# Patient Record
Sex: MALE | Race: WHITE | NOT HISPANIC OR LATINO
[De-identification: names, ages, dates, MRNs, and addresses within clinical notes are randomized per-mention and may not be internally consistent; named-entity substitution may affect disease eponyms.]

---

## 2020-05-19 ENCOUNTER — APPOINTMENT (OUTPATIENT)
Dept: PULMONOLOGY | Facility: CLINIC | Age: 25
End: 2020-05-19
Payer: COMMERCIAL

## 2020-05-19 DIAGNOSIS — Z56.0 UNEMPLOYMENT, UNSPECIFIED: ICD-10-CM

## 2020-05-19 DIAGNOSIS — Z87.09 PERSONAL HISTORY OF OTHER DISEASES OF THE RESPIRATORY SYSTEM: ICD-10-CM

## 2020-05-19 PROBLEM — Z00.00 ENCOUNTER FOR PREVENTIVE HEALTH EXAMINATION: Status: ACTIVE | Noted: 2020-05-19

## 2020-05-19 PROCEDURE — 99204 OFFICE O/P NEW MOD 45 MIN: CPT | Mod: 95

## 2020-05-19 SDOH — ECONOMIC STABILITY - INCOME SECURITY: UNEMPLOYMENT, UNSPECIFIED: Z56.0

## 2020-05-19 NOTE — REVIEW OF SYSTEMS
[Eye Irritation] : eye irritation [Sinus Problems] : sinus problems [Myalgias] : myalgias [Negative] : Endocrine

## 2020-05-19 NOTE — HISTORY OF PRESENT ILLNESS
[TextBox_4] : 05/19/2020: Consultation by video at pt's request for Covid follow up. Covid symptoms beginning 3/17 w anosmia, myalgias, fever, chills. Never tested or treated. Began to recover 10 days later but remained fatigued throughout April with some aches and pains. Had a sensation of chest pressure which has lifted. Overall 90% better, able to go on 1-2 hour excursions involving hiking, biking. Has a few concerns. 4x has had a "chest spasm" in the setting of exercise such as stretching, not vigorous exercise, lasting about 3 seconds. Two, feels a sense of sinus congestion and eye irritation especially after wearing mask. Three feels something - a lymph node? - in R posterior neck that seems to "flare" after wearing his mask. Reports sats 95% and resting HR 50s at home.\par

## 2020-05-19 NOTE — ASSESSMENT
[FreeTextEntry1] : Impression:\par Clinical dx Covid, improving\par ? lymphadenopathy\par \par Plan:\par Most of his symptoms are nonspecific and mild, and not worrisome. Can try a nasal saline spray for moisture w mask wearing if desired.\par Not possible to assess over video if he has an enlarged lymph node. This should be monitored and if it persists (2 weeks or whenever he is comfortable leaving home to see a physician) this should be evaluated.

## 2020-08-28 ENCOUNTER — APPOINTMENT (OUTPATIENT)
Dept: PULMONOLOGY | Facility: CLINIC | Age: 25
End: 2020-08-28

## 2020-09-16 ENCOUNTER — OUTPATIENT (OUTPATIENT)
Dept: OUTPATIENT SERVICES | Facility: HOSPITAL | Age: 25
LOS: 1 days | End: 2020-09-16
Payer: COMMERCIAL

## 2020-09-16 ENCOUNTER — APPOINTMENT (OUTPATIENT)
Dept: PULMONOLOGY | Facility: CLINIC | Age: 25
End: 2020-09-16
Payer: COMMERCIAL

## 2020-09-16 VITALS
HEIGHT: 65 IN | OXYGEN SATURATION: 98 % | DIASTOLIC BLOOD PRESSURE: 68 MMHG | HEART RATE: 88 BPM | BODY MASS INDEX: 24.32 KG/M2 | WEIGHT: 146 LBS | SYSTOLIC BLOOD PRESSURE: 100 MMHG | TEMPERATURE: 97.5 F

## 2020-09-16 DIAGNOSIS — R60.0 LOCALIZED EDEMA: ICD-10-CM

## 2020-09-16 DIAGNOSIS — Z11.59 ENCOUNTER FOR SCREENING FOR OTHER VIRAL DISEASES: ICD-10-CM

## 2020-09-16 DIAGNOSIS — U07.1 COVID-19: ICD-10-CM

## 2020-09-16 DIAGNOSIS — R06.02 SHORTNESS OF BREATH: ICD-10-CM

## 2020-09-16 PROCEDURE — 36415 COLL VENOUS BLD VENIPUNCTURE: CPT

## 2020-09-16 PROCEDURE — 99203 OFFICE O/P NEW LOW 30 MIN: CPT | Mod: 25

## 2020-09-16 PROCEDURE — 76604 US EXAM CHEST: CPT

## 2020-09-16 PROCEDURE — 93970 EXTREMITY STUDY: CPT

## 2020-09-16 PROCEDURE — 93970 EXTREMITY STUDY: CPT | Mod: 26

## 2020-09-16 PROCEDURE — 71046 X-RAY EXAM CHEST 2 VIEWS: CPT | Mod: 26

## 2020-09-16 PROCEDURE — 71046 X-RAY EXAM CHEST 2 VIEWS: CPT

## 2020-09-17 LAB — DEPRECATED D DIMER PPP IA-ACNC: <150 NG/ML DDU

## 2020-09-17 NOTE — PROCEDURE
[FreeTextEntry1] : 9/16/20  Bedside Lung Ultrasound\par No B lines seen\par No evidence of pleural effusion\par curtain sign appreciated

## 2020-09-17 NOTE — DISCUSSION/SUMMARY
[FreeTextEntry1] : Pertinent Physical Exam\par 100/68  HR 88  SpO2 98% on room air\par no pallor no icterus\par tiny lymph node R cervical chain\par No JVD at 45 degrees, No HJR\par No clinically detected HSM\par trace pitting edema RLE\par No dullness to percussion\par 2/6 systolic murmur aortic area radiating to LSB\par

## 2020-09-17 NOTE — HISTORY OF PRESENT ILLNESS
[TextBox_4] : Saw Dr. Wagner 05/19/2020: Consultation by video at pt's request for Covid follow up. Covid symptoms beginning 3/17 w anosmia, myalgias, fever, chills. Never tested or treated. Began to recover 10 days later but remained fatigued throughout April with some aches and pains. Had a sensation of chest pressure which has lifted. Overall 90% better, able to go on 1-2 hour excursions involving hiking, biking. Has a few concerns. 4x has had a "chest spasm" in the setting of exercise such as stretching, not vigorous exercise, lasting about 3 seconds. Two, feels a sense of sinus congestion and eye irritation especially after wearing mask. Three feels something - a lymph node? - in R posterior neck that seems to "flare" after wearing his mask. Reports sats 95% and resting HR 50s at home\par \par 9-16-20\par He had COVID in March The 17th was his 1st symptom. Took months of recuperation. Was only able to walk a block. Now able to ride his bike. Does not feel 100%. \par Went to cardiologist for chest pain. Dr. Whipple did an Echo and an EKG. Also had blood work.  Part of those labs was + COVID antibodies\par \par Current level of activity. Walks and bikes. Feels he is deconditioned\par Gets winded easily. When he bends over. 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

## 2020-09-17 NOTE — ASSESSMENT
[FreeTextEntry1] : It was a pleasure to meet Maverick in consultation today\par \par 1.Chest tightness and slight SOB\par -Most likely post-COVID phenomenon. Could have been involvement of lung with scarring and fibrosis that can persist. Can give rise to symptoms of chest tightness. \par -COVID seems to francheska about greater likelihood of blood clots. He has asymmetric swelling of LEs. We will get d-dimer and LE dopplers\par -Bedside US to look for lung abnormalities and pleural effusion did not reveal anything abnormal \par -We will obtain CXR and full PFTs\par \par RTC 1 month to review CXR and PFTs (can be Telehealth)\par

## 2020-09-17 NOTE — PHYSICAL EXAM
[No Acute Distress] : no acute distress [Normal Oropharynx] : normal oropharynx [No Neck Mass] : no neck mass [Normal Appearance] : normal appearance [No JVD] : no jvd [Murmur ___ / 6] : murmur [unfilled] / 6 [Normal Rate/Rhythm] : normal rate/rhythm [No Resp Distress] : no resp distress [Normal S1, S2] : normal s1, s2 [Clear to Auscultation Bilaterally] : clear to auscultation bilaterally [No Murmurs] : no murmurs [No Abnormalities] : no abnormalities [Benign] : benign [No Clubbing] : no clubbing [No Cyanosis] : no cyanosis [Normal Gait] : normal gait [FROM] : FROM [No Edema] : no edema [Normal Color/ Pigmentation] : normal color/ pigmentation [No Focal Deficits] : no focal deficits [Oriented x3] : oriented x3 [Normal Affect] : normal affect [TextBox_44] : tiny peas-sized lymph node palpated in Right posterior cervical chain [TextBox_54] : 2/6 systolic murmur aortic area radiating to LSB

## 2020-10-04 ENCOUNTER — LABORATORY RESULT (OUTPATIENT)
Age: 25
End: 2020-10-04

## 2020-10-06 ENCOUNTER — OUTPATIENT (OUTPATIENT)
Dept: OUTPATIENT SERVICES | Facility: HOSPITAL | Age: 25
LOS: 1 days | End: 2020-10-06
Payer: COMMERCIAL

## 2020-10-06 DIAGNOSIS — R06.02 SHORTNESS OF BREATH: ICD-10-CM

## 2020-10-06 PROCEDURE — 94729 DIFFUSING CAPACITY: CPT | Mod: 26

## 2020-10-06 PROCEDURE — 94618 PULMONARY STRESS TESTING: CPT | Mod: 26

## 2020-10-06 PROCEDURE — 94726 PLETHYSMOGRAPHY LUNG VOLUMES: CPT | Mod: 26

## 2020-10-06 PROCEDURE — 94760 N-INVAS EAR/PLS OXIMETRY 1: CPT

## 2020-10-06 PROCEDURE — 94618 PULMONARY STRESS TESTING: CPT

## 2020-10-06 PROCEDURE — 94010 BREATHING CAPACITY TEST: CPT | Mod: 26

## 2020-10-06 PROCEDURE — 94729 DIFFUSING CAPACITY: CPT

## 2020-10-06 PROCEDURE — 94726 PLETHYSMOGRAPHY LUNG VOLUMES: CPT

## 2020-10-06 PROCEDURE — 94060 EVALUATION OF WHEEZING: CPT

## 2020-10-07 LAB
SARS-COV-2 IGG SERPL IA-ACNC: 18.1 INDEX
SARS-COV-2 IGG SERPL QL IA: POSITIVE

## 2021-09-10 ENCOUNTER — APPOINTMENT (OUTPATIENT)
Dept: ORTHOPEDIC SURGERY | Facility: CLINIC | Age: 26
End: 2021-09-10
Payer: MEDICAID

## 2021-09-10 VITALS — WEIGHT: 150 LBS | RESPIRATION RATE: 16 BRPM | HEIGHT: 66 IN | BODY MASS INDEX: 24.11 KG/M2

## 2021-09-10 DIAGNOSIS — Z78.9 OTHER SPECIFIED HEALTH STATUS: ICD-10-CM

## 2021-09-10 PROCEDURE — 29085 APPL CAST HAND&LWR FOREARM: CPT | Mod: LT

## 2021-09-10 PROCEDURE — 99204 OFFICE O/P NEW MOD 45 MIN: CPT | Mod: 25

## 2021-09-10 PROCEDURE — 73130 X-RAY EXAM OF HAND: CPT | Mod: LT

## 2021-09-24 ENCOUNTER — APPOINTMENT (OUTPATIENT)
Dept: ORTHOPEDIC SURGERY | Facility: CLINIC | Age: 26
End: 2021-09-24
Payer: COMMERCIAL

## 2021-09-24 PROCEDURE — 99213 OFFICE O/P EST LOW 20 MIN: CPT | Mod: 25

## 2021-09-24 PROCEDURE — 73130 X-RAY EXAM OF HAND: CPT | Mod: LT

## 2021-09-24 PROCEDURE — 29085 APPL CAST HAND&LWR FOREARM: CPT | Mod: LT

## 2021-10-22 ENCOUNTER — APPOINTMENT (OUTPATIENT)
Dept: ORTHOPEDIC SURGERY | Facility: CLINIC | Age: 26
End: 2021-10-22
Payer: MEDICAID

## 2021-10-22 PROCEDURE — 99213 OFFICE O/P EST LOW 20 MIN: CPT

## 2021-10-22 PROCEDURE — 73120 X-RAY EXAM OF HAND: CPT | Mod: LT

## 2022-03-10 ENCOUNTER — APPOINTMENT (OUTPATIENT)
Dept: ORTHOPEDIC SURGERY | Facility: CLINIC | Age: 27
End: 2022-03-10
Payer: MEDICAID

## 2022-03-10 PROCEDURE — 99213 OFFICE O/P EST LOW 20 MIN: CPT

## 2022-03-10 PROCEDURE — 73110 X-RAY EXAM OF WRIST: CPT | Mod: 50

## 2022-03-10 PROCEDURE — 73070 X-RAY EXAM OF ELBOW: CPT | Mod: RT

## 2022-03-24 ENCOUNTER — APPOINTMENT (OUTPATIENT)
Dept: ORTHOPEDIC SURGERY | Facility: CLINIC | Age: 27
End: 2022-03-24
Payer: MEDICAID

## 2022-03-24 DIAGNOSIS — S59.901D UNSPECIFIED INJURY OF RIGHT ELBOW, SUBSEQUENT ENCOUNTER: ICD-10-CM

## 2022-03-24 PROCEDURE — 99213 OFFICE O/P EST LOW 20 MIN: CPT

## 2022-04-11 PROBLEM — Z11.59 SCREENING FOR VIRAL DISEASE: Status: ACTIVE | Noted: 2020-09-16

## 2023-06-21 ENCOUNTER — APPOINTMENT (OUTPATIENT)
Dept: ORTHOPEDIC SURGERY | Facility: CLINIC | Age: 28
End: 2023-06-21
Payer: COMMERCIAL

## 2023-06-21 DIAGNOSIS — S69.90XA UNSPECIFIED INJURY OF UNSPECIFIED WRIST, HAND AND FINGER(S), INITIAL ENCOUNTER: ICD-10-CM

## 2023-06-21 PROCEDURE — 99214 OFFICE O/P EST MOD 30 MIN: CPT

## 2023-06-21 PROCEDURE — 73140 X-RAY EXAM OF FINGER(S): CPT
